# Patient Record
Sex: MALE | Race: WHITE
[De-identification: names, ages, dates, MRNs, and addresses within clinical notes are randomized per-mention and may not be internally consistent; named-entity substitution may affect disease eponyms.]

---

## 2018-04-18 ENCOUNTER — HOSPITAL ENCOUNTER (OUTPATIENT)
Dept: HOSPITAL 53 - M OUTALCOH | Age: 23
End: 2018-04-18
Attending: PSYCHIATRY & NEUROLOGY
Payer: COMMERCIAL

## 2018-04-18 DIAGNOSIS — F10.10: Primary | ICD-10-CM

## 2018-04-21 ENCOUNTER — HOSPITAL ENCOUNTER (EMERGENCY)
Dept: HOSPITAL 53 - M ED | Age: 23
LOS: 1 days | Discharge: HOME | End: 2018-04-22
Payer: COMMERCIAL

## 2018-04-21 DIAGNOSIS — F17.200: ICD-10-CM

## 2018-04-21 DIAGNOSIS — F10.129: Primary | ICD-10-CM

## 2018-04-21 LAB
ACETAMINOPHEN LEVEL: < 2 UG/ML (ref 10–30)
ALBUMIN/GLOBULIN RATIO: 1.38 (ref 1–1.93)
ALBUMIN: 4.4 GM/DL (ref 3.2–5.2)
ALKALINE PHOSPHATASE: 55 U/L (ref 45–117)
ALT SERPL W P-5'-P-CCNC: 21 U/L (ref 12–78)
AMPHETAMINES UR QL SCN: NEGATIVE
ANION GAP: 9 MEQ/L (ref 8–16)
AST SERPL-CCNC: 23 U/L (ref 7–37)
BARBITURATES UR QL SCN: NEGATIVE
BENZODIAZ UR QL SCN: NEGATIVE
BILIRUB CONJ SERPL-MCNC: < 0.1 MG/DL (ref 0–0.2)
BILIRUBIN,TOTAL: 0.3 MG/DL (ref 0.2–1)
BLOOD UREA NITROGEN: 8 MG/DL (ref 7–18)
BZE UR QL SCN: NEGATIVE
CALCIUM LEVEL: 8.7 MG/DL (ref 8.5–10.1)
CANNABINOIDS UR QL SCN: NEGATIVE
CARBON DIOXIDE LEVEL: 26 MEQ/L (ref 21–32)
CHLORIDE LEVEL: 110 MEQ/L (ref 98–107)
CREATININE FOR GFR: 0.81 MG/DL (ref 0.7–1.3)
ETHYL ALCOHOL (ETHANOL): 0.2 % (ref 0–0.01)
GFR SERPL CREATININE-BSD FRML MDRD: > 60 ML/MIN/{1.73_M2} (ref 60–?)
GLUCOSE, FASTING: 84 MG/DL (ref 70–100)
HEMATOCRIT: 47.5 % (ref 42–52)
HEMOGLOBIN: 15.9 G/DL (ref 13.5–17.5)
MEAN CORPUSCULAR HEMOGLOBIN: 30.8 PG (ref 27–33)
MEAN CORPUSCULAR HGB CONC: 33.5 G/DL (ref 32–36.5)
MEAN CORPUSCULAR VOLUME: 91.9 FL (ref 80–96)
METHADONE URINE: NEGATIVE
NRBC BLD AUTO-RTO: 0 % (ref 0–0)
OPIATES UR QL SCN: NEGATIVE
PCP UR QL SCN: NEGATIVE
PLATELET COUNT, AUTOMATED: 267 10^3/UL (ref 150–450)
POTASSIUM SERUM: 3.7 MEQ/L (ref 3.5–5.1)
RED BLOOD COUNT: 5.17 10^6/UL (ref 4.3–6.1)
RED CELL DISTRIBUTION WIDTH: 12.7 % (ref 11.5–14.5)
SALICYLATE LEVEL: 2.8 MG/DL (ref 5–30)
SODIUM LEVEL: 145 MEQ/L (ref 136–145)
THYROID STIMULATING HORMONE: 0.5 UIU/ML (ref 0.36–3.74)
TOTAL PROTEIN: 7.6 GM/DL (ref 6.4–8.2)
WHITE BLOOD COUNT: 10.4 10^3/UL (ref 4–10)

## 2018-04-21 PROCEDURE — 90715 TDAP VACCINE 7 YRS/> IM: CPT

## 2018-04-21 RX ADMIN — TETANUS TOXOID, REDUCED DIPHTHERIA TOXOID AND ACELLULAR PERTUSSIS VACCINE, ADSORBED 1 ML: 5; 2.5; 8; 8; 2.5 SUSPENSION INTRAMUSCULAR at 20:30

## 2018-04-27 ENCOUNTER — HOSPITAL ENCOUNTER (OUTPATIENT)
Dept: HOSPITAL 53 - M OUTALCOH | Age: 23
LOS: 3 days | End: 2018-04-30
Attending: PSYCHIATRY & NEUROLOGY
Payer: MEDICAID

## 2018-04-27 DIAGNOSIS — F17.200: ICD-10-CM

## 2018-04-27 DIAGNOSIS — F10.10: Primary | ICD-10-CM

## 2018-05-11 ENCOUNTER — HOSPITAL ENCOUNTER (OUTPATIENT)
Dept: HOSPITAL 53 - M OUTALCOH | Age: 23
LOS: 20 days | End: 2018-05-31
Attending: PSYCHIATRY & NEUROLOGY
Payer: MEDICAID

## 2018-05-11 DIAGNOSIS — F10.10: Primary | ICD-10-CM

## 2018-05-11 DIAGNOSIS — F17.200: ICD-10-CM

## 2019-08-10 ENCOUNTER — HOSPITAL ENCOUNTER (EMERGENCY)
Dept: HOSPITAL 53 - M ED | Age: 24
Discharge: HOME | End: 2019-08-10
Payer: COMMERCIAL

## 2019-08-10 VITALS — SYSTOLIC BLOOD PRESSURE: 137 MMHG | DIASTOLIC BLOOD PRESSURE: 73 MMHG

## 2019-08-10 DIAGNOSIS — Z04.1: Primary | ICD-10-CM

## 2019-08-10 DIAGNOSIS — F10.229: ICD-10-CM
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
## 2019-08-10 LAB
ALBUMIN SERPL BCG-MCNC: 4.6 GM/DL (ref 3.2–5.2)
ALT SERPL W P-5'-P-CCNC: 16 U/L (ref 12–78)
AMPHETAMINES UR QL SCN: NEGATIVE
AMYLASE SERPL-CCNC: 38 U/L (ref 25–115)
APTT BLD: 27.1 SECONDS (ref 25–38.4)
BARBITURATES UR QL SCN: NEGATIVE
BASOPHILS # BLD AUTO: 0.1 10^3/UL (ref 0–0.2)
BASOPHILS NFR BLD AUTO: 0.7 % (ref 0–1)
BENZODIAZ UR QL SCN: NEGATIVE
BILIRUB CONJ SERPL-MCNC: < 0.1 MG/DL (ref 0–0.2)
BILIRUB SERPL-MCNC: 0.4 MG/DL (ref 0.2–1)
BUN SERPL-MCNC: 7 MG/DL (ref 7–18)
BZE UR QL SCN: NEGATIVE
CALCIUM SERPL-MCNC: 9 MG/DL (ref 8.5–10.1)
CANNABINOIDS UR QL SCN: NEGATIVE
CHLORIDE SERPL-SCNC: 106 MEQ/L (ref 98–107)
CK MB CFR.DF SERPL CALC: 1.46
CK MB SERPL-MCNC: 5.2 NG/ML (ref ?–3.6)
CK SERPL-CCNC: 357 U/L (ref 39–308)
CO2 SERPL-SCNC: 25 MEQ/L (ref 21–32)
CREAT SERPL-MCNC: 1 MG/DL (ref 0.7–1.3)
EOSINOPHIL # BLD AUTO: 0.2 10^3/UL (ref 0–0.5)
EOSINOPHIL NFR BLD AUTO: 2.3 % (ref 0–3)
ETHANOL SERPL-MCNC: 0.08 % (ref 0–0.01)
GFR SERPL CREATININE-BSD FRML MDRD: > 60 ML/MIN/{1.73_M2} (ref 60–?)
GLUCOSE SERPL-MCNC: 92 MG/DL (ref 70–100)
HCT VFR BLD AUTO: 45.9 % (ref 42–52)
HGB BLD-MCNC: 15.6 G/DL (ref 13.5–17.5)
INR PPP: 1.14
LIPASE SERPL-CCNC: 123 U/L (ref 73–393)
LYMPHOCYTES # BLD AUTO: 1.8 10^3/UL (ref 1.5–6.5)
LYMPHOCYTES NFR BLD AUTO: 25.4 % (ref 24–44)
MCH RBC QN AUTO: 31 PG (ref 27–33)
MCHC RBC AUTO-ENTMCNC: 34 G/DL (ref 32–36.5)
MCV RBC AUTO: 91.3 FL (ref 80–96)
METHADONE UR QL SCN: NEGATIVE
MONOCYTES # BLD AUTO: 0.4 10^3/UL (ref 0–0.8)
MONOCYTES NFR BLD AUTO: 5.5 % (ref 0–5)
NEUTROPHILS # BLD AUTO: 4.7 10^3/UL (ref 1.8–7.7)
NEUTROPHILS NFR BLD AUTO: 65.8 % (ref 36–66)
OPIATES UR QL SCN: NEGATIVE
PCP UR QL SCN: NEGATIVE
PLATELET # BLD AUTO: 235 10^3/UL (ref 150–450)
POTASSIUM SERPL-SCNC: 3.5 MEQ/L (ref 3.5–5.1)
PROT SERPL-MCNC: 7.5 GM/DL (ref 6.4–8.2)
PROTHROMBIN TIME: 14.3 SECONDS (ref 11.8–14)
RBC # BLD AUTO: 5.03 10^6/UL (ref 4.3–6.1)
SODIUM SERPL-SCNC: 141 MEQ/L (ref 136–145)
TROPONIN I SERPL-MCNC: < 0.02 NG/ML (ref ?–0.1)
WBC # BLD AUTO: 7.1 10^3/UL (ref 4–10)

## 2019-08-10 PROCEDURE — 84484 ASSAY OF TROPONIN QUANT: CPT

## 2019-08-10 PROCEDURE — 82550 ASSAY OF CK (CPK): CPT

## 2019-08-10 PROCEDURE — 99285 EMERGENCY DEPT VISIT HI MDM: CPT

## 2019-08-10 PROCEDURE — 80307 DRUG TEST PRSMV CHEM ANLYZR: CPT

## 2019-08-10 PROCEDURE — 85730 THROMBOPLASTIN TIME PARTIAL: CPT

## 2019-08-10 PROCEDURE — 71260 CT THORAX DX C+: CPT

## 2019-08-10 PROCEDURE — 96360 HYDRATION IV INFUSION INIT: CPT

## 2019-08-10 PROCEDURE — 74177 CT ABD & PELVIS W/CONTRAST: CPT

## 2019-08-10 PROCEDURE — 82553 CREATINE MB FRACTION: CPT

## 2019-08-10 PROCEDURE — 83690 ASSAY OF LIPASE: CPT

## 2019-08-10 PROCEDURE — 93041 RHYTHM ECG TRACING: CPT

## 2019-08-10 PROCEDURE — 94760 N-INVAS EAR/PLS OXIMETRY 1: CPT

## 2019-08-10 PROCEDURE — 72131 CT LUMBAR SPINE W/O DYE: CPT

## 2019-08-10 PROCEDURE — 93005 ELECTROCARDIOGRAM TRACING: CPT

## 2019-08-10 PROCEDURE — 85025 COMPLETE CBC W/AUTO DIFF WBC: CPT

## 2019-08-10 PROCEDURE — 80048 BASIC METABOLIC PNL TOTAL CA: CPT

## 2019-08-10 PROCEDURE — 86901 BLOOD TYPING SEROLOGIC RH(D): CPT

## 2019-08-10 PROCEDURE — 86850 RBC ANTIBODY SCREEN: CPT

## 2019-08-10 PROCEDURE — 81001 URINALYSIS AUTO W/SCOPE: CPT

## 2019-08-10 PROCEDURE — 72125 CT NECK SPINE W/O DYE: CPT

## 2019-08-10 PROCEDURE — 86900 BLOOD TYPING SEROLOGIC ABO: CPT

## 2019-08-10 PROCEDURE — 72128 CT CHEST SPINE W/O DYE: CPT

## 2019-08-10 PROCEDURE — 85610 PROTHROMBIN TIME: CPT

## 2019-08-10 PROCEDURE — 82150 ASSAY OF AMYLASE: CPT

## 2019-08-10 PROCEDURE — 80076 HEPATIC FUNCTION PANEL: CPT

## 2019-08-10 PROCEDURE — 70450 CT HEAD/BRAIN W/O DYE: CPT

## 2019-08-10 NOTE — ECGEPIP
[facility] - ED

                                       

                                       Test Date:    2019-08-10

Pat Name:     JANE MONAHAN            Department:   

Patient ID:   [id number]                 Room:         -

Gender:       Male                     Technician:   

:          1995               Requested By: JOE AHUJA

Order Number: [id number]     Reading MD:   Steffanie Alba

                                 Measurements

Intervals                              Axis          

Rate:         113                      P:            62

DC:           161                      QRS:          69

QRSD:         93                       T:            25

QT:           331                                    

QTc:          455                                    

                           Interpretive Statements

SINUS TACHYCARDIA

NONSPECIFIC ST & T-WAVE ABNORMALITY

ABNORMAL RHYTHM ECG

No prior

Electronically Signed on 8- 12:49:05 EDT by Steffanie Alba
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
## 2023-02-01 ENCOUNTER — HOSPITAL ENCOUNTER (OUTPATIENT)
Dept: HOSPITAL 53 - M OUTALCOH | Age: 28
End: 2023-02-01
Attending: PSYCHIATRY & NEUROLOGY
Payer: MEDICAID

## 2023-02-01 DIAGNOSIS — Z13.39: Primary | ICD-10-CM

## 2023-02-15 ENCOUNTER — HOSPITAL ENCOUNTER (OUTPATIENT)
Dept: HOSPITAL 53 - M OUTALCOH | Age: 28
LOS: 13 days | End: 2023-02-28
Attending: PSYCHIATRY & NEUROLOGY
Payer: MEDICAID

## 2023-02-15 DIAGNOSIS — F10.10: Primary | ICD-10-CM

## 2023-02-15 DIAGNOSIS — F17.200: ICD-10-CM
